# Patient Record
Sex: FEMALE | Race: OTHER | NOT HISPANIC OR LATINO | URBAN - METROPOLITAN AREA
[De-identification: names, ages, dates, MRNs, and addresses within clinical notes are randomized per-mention and may not be internally consistent; named-entity substitution may affect disease eponyms.]

---

## 2023-11-26 ENCOUNTER — OUTPATIENT (OUTPATIENT)
Dept: OUTPATIENT SERVICES | Facility: HOSPITAL | Age: 28
LOS: 1 days | End: 2023-11-26
Payer: MEDICAID

## 2023-11-26 DIAGNOSIS — O26.899 OTHER SPECIFIED PREGNANCY RELATED CONDITIONS, UNSPECIFIED TRIMESTER: ICD-10-CM

## 2023-11-26 DIAGNOSIS — Z3A.00 WEEKS OF GESTATION OF PREGNANCY NOT SPECIFIED: ICD-10-CM

## 2023-11-26 PROCEDURE — 59025 FETAL NON-STRESS TEST: CPT

## 2023-11-26 PROCEDURE — G0463: CPT

## 2023-12-30 ENCOUNTER — OUTPATIENT (OUTPATIENT)
Dept: OUTPATIENT SERVICES | Facility: HOSPITAL | Age: 28
LOS: 1 days | End: 2023-12-30
Payer: MEDICAID

## 2023-12-30 VITALS
OXYGEN SATURATION: 96 % | SYSTOLIC BLOOD PRESSURE: 101 MMHG | TEMPERATURE: 98 F | RESPIRATION RATE: 17 BRPM | DIASTOLIC BLOOD PRESSURE: 67 MMHG | HEART RATE: 93 BPM

## 2023-12-30 DIAGNOSIS — O26.899 OTHER SPECIFIED PREGNANCY RELATED CONDITIONS, UNSPECIFIED TRIMESTER: ICD-10-CM

## 2023-12-30 LAB
APPEARANCE UR: CLEAR — SIGNIFICANT CHANGE UP
APPEARANCE UR: CLEAR — SIGNIFICANT CHANGE UP
BILIRUB UR-MCNC: NEGATIVE — SIGNIFICANT CHANGE UP
BILIRUB UR-MCNC: NEGATIVE — SIGNIFICANT CHANGE UP
COLOR SPEC: YELLOW — SIGNIFICANT CHANGE UP
COLOR SPEC: YELLOW — SIGNIFICANT CHANGE UP
DIFF PNL FLD: NEGATIVE — SIGNIFICANT CHANGE UP
DIFF PNL FLD: NEGATIVE — SIGNIFICANT CHANGE UP
GLUCOSE UR QL: NEGATIVE MG/DL — SIGNIFICANT CHANGE UP
GLUCOSE UR QL: NEGATIVE MG/DL — SIGNIFICANT CHANGE UP
KETONES UR-MCNC: NEGATIVE MG/DL — SIGNIFICANT CHANGE UP
KETONES UR-MCNC: NEGATIVE MG/DL — SIGNIFICANT CHANGE UP
LEUKOCYTE ESTERASE UR-ACNC: ABNORMAL
LEUKOCYTE ESTERASE UR-ACNC: ABNORMAL
NITRITE UR-MCNC: NEGATIVE — SIGNIFICANT CHANGE UP
NITRITE UR-MCNC: NEGATIVE — SIGNIFICANT CHANGE UP
PH UR: 5.5 — SIGNIFICANT CHANGE UP (ref 5–8)
PH UR: 5.5 — SIGNIFICANT CHANGE UP (ref 5–8)
PROT UR-MCNC: NEGATIVE MG/DL — SIGNIFICANT CHANGE UP
PROT UR-MCNC: NEGATIVE MG/DL — SIGNIFICANT CHANGE UP
SP GR SPEC: 1.02 — SIGNIFICANT CHANGE UP (ref 1–1.03)
SP GR SPEC: 1.02 — SIGNIFICANT CHANGE UP (ref 1–1.03)
UROBILINOGEN FLD QL: 0.2 MG/DL — SIGNIFICANT CHANGE UP (ref 0.2–1)
UROBILINOGEN FLD QL: 0.2 MG/DL — SIGNIFICANT CHANGE UP (ref 0.2–1)

## 2023-12-30 PROCEDURE — G0463: CPT

## 2023-12-30 PROCEDURE — 99221 1ST HOSP IP/OBS SF/LOW 40: CPT | Mod: 25

## 2023-12-30 PROCEDURE — 81001 URINALYSIS AUTO W/SCOPE: CPT

## 2023-12-30 PROCEDURE — 59025 FETAL NON-STRESS TEST: CPT | Mod: 26

## 2023-12-30 PROCEDURE — 59025 FETAL NON-STRESS TEST: CPT

## 2023-12-30 PROCEDURE — 99285 EMERGENCY DEPT VISIT HI MDM: CPT

## 2023-12-30 NOTE — OB PROVIDER TRIAGE NOTE - ADDITIONAL INSTRUCTIONS
-Discharge home with strict precautions  -Report back to triage with vaginal bleeding, leakage of fluid, decreased fetal movement, abdominal or pelvic pain or any other concerns.  -Continue to use treatment for yeast infection  -Kick counts reviewed  -Increase oral hydration

## 2023-12-30 NOTE — OB PROVIDER TRIAGE NOTE - NS_DISPOSITION_OBGYN_ALL_OB
Home med: Losartan, Chlorothiazide     - MED REC NEEDED  - Hold BP Med  if  BP: <110/60, HR Discharge Home med: Losartan, Chlorothiazide   - MED REC NEEDED  - Hold BP Med  if  BP: <110/60,

## 2023-12-30 NOTE — OB PROVIDER TRIAGE NOTE - NSOBPROVIDERNOTE_OBGYN_ALL_OB_FT
28 year old  at 31w6d for rule out  labor vs UTI.  Current yeast infection 28 year old  at 31w6d for rule out  labor vs UTI.  Current yeast infection.  Reactive tracing with reassuring fetal status.     -Discharge home with strict precautions  -Report back to triage with vaginal bleeding, leakage of fluid, decreased fetal movement, abdominal or pelvic pain or any other concerns.  -Continue to use treatment for yeast infection  -Kick counts reviewed  -Increase oral hydration    Discussed with Dr Xavier

## 2023-12-30 NOTE — OB PROVIDER TRIAGE NOTE - NSHPPHYSICALEXAM_GEN_ALL_CORE
Gen: A&Ox3, NAD  Chest: CTABL   Cardiac: S1+S2+ RRR  Abdomen: Soft, nontender, +BS, no guarding, no rebound, gravid uterus  Extremities: Nontender, no worsening edema, DTRS 2+    VE: C/L/P + yeast on exam

## 2023-12-30 NOTE — OB RN TRIAGE NOTE - NS_DISCHARGEPRINT_OBGYN_ALL_OB
OB Discharge Instructions/[place]  OB Triage Discharge Instructions  OB Discharge Instructions/[place]  OB Triage Discharge Instructions

## 2023-12-30 NOTE — OB RN TRIAGE NOTE - NSNURSINGINSTR_OBGYN_ALL_OB_FT
Discharge instructions given to patient. Patient verbalized understanding. Fetal Kick count given to patient. No distress noted and no other concerns to be addressed at this time

## 2023-12-30 NOTE — OB PROVIDER TRIAGE NOTE - HISTORY OF PRESENT ILLNESS
Patient is a 28 year old  at 31w6d who presents to triage with complaint of lower abdominal and pelvic pain last night.  States that she felt her abdomen get hard approx one time an hour.  States that this morning she feels better.  Denies vaginal bleeding, leakage of fluid, decreased fetal movement or any other concerns.  Currently undergoing treatment for yeast infection.   PNC with Dr Xavier - pregnancy complicated with recurrent chlamydia   PMhx: Denies  SxHx: Denies  Meds: PNV, terconazole  Allergies: NKDA  OBHx: Primigravida  GynHx: Normal menses, one partner, + chlamydia, no other known stds, no cysts, no fibroids  SocialHx: neg x 3, no anxiety or depression

## 2023-12-30 NOTE — OB RN TRIAGE NOTE - FALL HARM RISK - UNIVERSAL INTERVENTIONS
Bed in lowest position, wheels locked, appropriate side rails in place/Call bell, personal items and telephone in reach/Instruct patient to call for assistance before getting out of bed or chair/Non-slip footwear when patient is out of bed/[place] to call system/Physically safe environment - no spills, clutter or unnecessary equipment/Purposeful Proactive Rounding/Room/bathroom lighting operational, light cord in reach Bed in lowest position, wheels locked, appropriate side rails in place/Call bell, personal items and telephone in reach/Instruct patient to call for assistance before getting out of bed or chair/Non-slip footwear when patient is out of bed/[place] to call system/Physically safe environment - no spills, clutter or unnecessary equipment/Purposeful Proactive Rounding/Room/bathroom lighting operational, light cord in reach

## 2024-01-02 DIAGNOSIS — Z3A.31 31 WEEKS GESTATION OF PREGNANCY: ICD-10-CM

## 2024-01-02 DIAGNOSIS — R10.30 LOWER ABDOMINAL PAIN, UNSPECIFIED: ICD-10-CM

## 2024-01-02 DIAGNOSIS — R10.2 PELVIC AND PERINEAL PAIN: ICD-10-CM

## 2024-01-02 DIAGNOSIS — B37.9 CANDIDIASIS, UNSPECIFIED: ICD-10-CM

## 2024-01-02 DIAGNOSIS — O26.893 OTHER SPECIFIED PREGNANCY RELATED CONDITIONS, THIRD TRIMESTER: ICD-10-CM

## 2024-01-02 DIAGNOSIS — O98.813 OTHER MATERNAL INFECTIOUS AND PARASITIC DISEASES COMPLICATING PREGNANCY, THIRD TRIMESTER: ICD-10-CM

## 2024-02-27 ENCOUNTER — OUTPATIENT (OUTPATIENT)
Dept: OUTPATIENT SERVICES | Facility: HOSPITAL | Age: 29
LOS: 1 days | End: 2024-02-27
Payer: MEDICAID

## 2024-02-27 VITALS
RESPIRATION RATE: 18 BRPM | HEART RATE: 107 BPM | TEMPERATURE: 98 F | DIASTOLIC BLOOD PRESSURE: 83 MMHG | SYSTOLIC BLOOD PRESSURE: 122 MMHG | OXYGEN SATURATION: 99 %

## 2024-02-27 DIAGNOSIS — O26.899 OTHER SPECIFIED PREGNANCY RELATED CONDITIONS, UNSPECIFIED TRIMESTER: ICD-10-CM

## 2024-02-27 PROBLEM — Z78.9 OTHER SPECIFIED HEALTH STATUS: Chronic | Status: ACTIVE | Noted: 2023-12-30

## 2024-02-27 LAB — AMNISURE ROM (RUPTURE OF MEMBRANES): NEGATIVE — SIGNIFICANT CHANGE UP

## 2024-02-27 PROCEDURE — 84112 EVAL AMNIOTIC FLUID PROTEIN: CPT

## 2024-02-27 PROCEDURE — 99212 OFFICE O/P EST SF 10 MIN: CPT

## 2024-02-27 PROCEDURE — 76815 OB US LIMITED FETUS(S): CPT

## 2024-02-27 PROCEDURE — 59025 FETAL NON-STRESS TEST: CPT

## 2024-02-27 PROCEDURE — 76819 FETAL BIOPHYS PROFIL W/O NST: CPT

## 2024-02-27 PROCEDURE — 76815 OB US LIMITED FETUS(S): CPT | Mod: 26

## 2024-02-27 PROCEDURE — 76819 FETAL BIOPHYS PROFIL W/O NST: CPT | Mod: 26

## 2024-02-27 PROCEDURE — G0463: CPT

## 2024-02-27 NOTE — OB PROVIDER TRIAGE NOTE - HISTORY OF PRESENT ILLNESS
This is a 29 y/o female  presenting for a NST/BPP. Patient was seen by dr Xavier on  and not yet dilated, was informed to follow up today for a NST and BPP for post dates . Pt offers no complaints today, she states she is having pressure but no contractions, vaginal bleeding.  + fetal movement and minimal clear discharge. Pt indicates that during her pregnancy she was treated for candida. stopped taking prenatals about a month ago. uncomplicated prenatal care.   GYN: LMP 23, EDC: 24, no STD, fibroids or cysts, candida - treated   PMHx; none  PSHx:  none  allergies : none   no drugs, smoking or illicit drugs during pregnancy    29 y/o female  siup @ 40.2wks  presenting for a NST/BPP. Patient was seen by dr Xavier on  and not yet dilated, was informed to follow up today for a NST and BPP for post dates . Pt offers no complaints today, she states she is having pressure but no contractions, vaginal bleeding.  + fetal movement and minimal clear discharge. Pt indicates that during her pregnancy she was treated for candida.   Uncomplicated PNC with Dr. Xavier    PMHx; denies  PSHx:  denies  Pgyn: LMP 2023, +candida in pregnancy treated, denies h/o ovarian cysts or fibroids; denies STDs or abnormal pap smears  allergies : nkda   social: no drugs, smoking or illicit drugs during pregnancy

## 2024-02-27 NOTE — OB PROVIDER TRIAGE NOTE - NSOBPROVIDERNOTE_OBGYN_ALL_OB_FT
29 y/o  here for NST/BPP   if pt is stable and NST and BPP are reassuring may come back on Friday for induction of labor  seen with TYLER CORNELIUS reviewed with Dr. Rodriguez    d/w dr Xavier 29 y/o  here for NST/BPP   if pt is stable and NST and BPP are reassuring may come back on Friday for induction of labor  amnisure done   seen with TYLER CORNELIUS reviewed with Dr. Rodriguez    d/w dr Xavier 29 y/o  here for NST/BPP , R/O prom  -bpp and nst reassuring  -f/u amnisure    d/w Dr Duc CORNELIUS reviewed with Dr. Rodriguez 27 y/o  here for NST/BPP , R/O prom  -bpp and nst reassuring  - amnisure negative   - will schedule for induction on Thursday, per dr Xavier   - d/c home with labor precaution and fetal kick instructions   - NST review with dr Rodriguez   - d/w dr Xavier

## 2024-02-27 NOTE — OB PROVIDER TRIAGE NOTE - NSHPPHYSICALEXAM_GEN_ALL_CORE
General : a/o x3, comfortable, in NAD  abd: soft nontender  gravid Pt comfortable in NAD  ICU Vital Signs Last 24 Hrs  T(C): 36.9 (27 Feb 2024 11:23), Max: 36.9 (27 Feb 2024 11:23)  T(F): 98.4 (27 Feb 2024 11:23), Max: 98.4 (27 Feb 2024 11:23)  HR: 107 (27 Feb 2024 11:23) (107 - 107)  BP: 122/83 (27 Feb 2024 11:23) (122/83 - 122/83)  RR: 18 (27 Feb 2024 11:23) (18 - 18)  SpO2: 99% (27 Feb 2024 11:23) (99% - 99%)    Abd: gravid, soft nontender    Ext; soft, NT, no edema  SSE: small amount of cream/white discharge, no vaginal bleeding   SVE: 0.5/50/-3/int, soft

## 2024-02-27 NOTE — OB PROVIDER TRIAGE NOTE - ADDITIONAL INSTRUCTIONS
-bpp and nst reassuring  - amnisure negative   - scheduled for induction on Thursday at 1 pm , per dr Xavier   - d/c home with labor precaution and fetal kick instructions   - NST review with dr Rodriguez   - d/w dr Xavier

## 2024-02-27 NOTE — OB RN TRIAGE NOTE - CHIEF COMPLAINT QUOTE
"Doctor told me to come for a sonogram because I am past my due date" "Doctor told me to come for a sonogram because I am past my due date and I have had clear fluid in my underwear since Wednesday"

## 2024-02-27 NOTE — OB PROVIDER TRIAGE NOTE - NSHPLABSRESULTS_GEN_ALL_CORE
< from: US OB Fetus Limited (02.27.24 @ 12:27) >    PROCEDURE DATE:  02/27/2024          INTERPRETATION:  Transabdominal obstetrical ultrasound for fetal position   and biophysical profile    Indication: Postdates evaluation.    Transabdominal obstetrical ultrasound is performed for fetal position and   biophysical profile. A single intrauterine gestation is seen. The fetal   presentation is cephalic. The cervix is not visualized due to shadowing   from the fetal skull. The placenta is posterior in location and it   appears grossly intact. There is positive fetal cardiac activity at 143   BPM. The MALLIKA measures 6.5 cm.    Fetal measurements for gestational age:  Biparietal diameter 9.55 cm, 39 weeks and 0days  Head circumference 33.89 cm, 39 weeks and 0 days  Abdominal circumference 34.89 cm, 38 weeks and 6 days  Femoral length 7.27 cm, 37 weeks and 2 days    The average gestational age by ultrasound is 38 weeks and 4 days.   Expected date of delivery by ultrasound is 3/8/2024.    Biophysical profile receives a score of 8 out of 8. Estimated fetal   weight is 3507 g    The fetal diaphragm, stomach, kidneys and urinary bladder appear grossly   unremarkable.    Impression: Single live intrauterine gestation with a biophysical profile   of 8 out of 8.    The MALLIKA measures 6.5 cm.    --- End of Report ---    < from:  Fetal Bio Profile w/o Non-Stress (02.27.24 @ 12:27) >    PROCEDURE DATE:  02/27/2024          INTERPRETATION:  Transabdominal obstetrical ultrasound for fetal position   and biophysical profile    Indication: Postdates evaluation.    Transabdominal obstetrical ultrasound is performed for fetal position and   biophysical profile. A single intrauterine gestation is seen. The fetal   presentation is cephalic. The cervix is not visualized due to shadowing   from the fetal skull. The placenta is posterior in location and it   appears grossly intact. There is positive fetal cardiac activity at 143   BPM. The MALLIKA measures 6.5 cm.    Fetal measurements for gestational age:  Biparietal diameter 9.55 cm, 39 weeks and 0days  Head circumference 33.89 cm, 39 weeks and 0 days  Abdominal circumference 34.89 cm, 38 weeks and 6 days  Femoral length 7.27 cm, 37 weeks and 2 days    The average gestational age by ultrasound is 38 weeks and 4 days.   Expected date of delivery by ultrasound is 3/8/2024.    Biophysical profile receives a score of 8 out of 8. Estimated fetal   weight is 3507 g    The fetal diaphragm, stomach, kidneys and urinary bladder appear grossly   unremarkable.    Impression: Single live intrauterine gestation with a biophysical profile   of 8 out of 8.    The MALLIKA measures 6.5 cm.    --- End of Report ---      < end of copied text >

## 2024-02-29 ENCOUNTER — INPATIENT (INPATIENT)
Facility: HOSPITAL | Age: 29
LOS: 2 days | Discharge: HOME | End: 2024-03-03
Attending: OBSTETRICS & GYNECOLOGY | Admitting: OBSTETRICS & GYNECOLOGY
Payer: MEDICAID

## 2024-02-29 VITALS
WEIGHT: 154.1 LBS | OXYGEN SATURATION: 98 % | HEART RATE: 100 BPM | RESPIRATION RATE: 15 BRPM | HEIGHT: 57 IN | TEMPERATURE: 99 F | SYSTOLIC BLOOD PRESSURE: 123 MMHG | DIASTOLIC BLOOD PRESSURE: 82 MMHG

## 2024-02-29 DIAGNOSIS — Z03.71 ENCOUNTER FOR SUSPECTED PROBLEM WITH AMNIOTIC CAVITY AND MEMBRANE RULED OUT: ICD-10-CM

## 2024-02-29 DIAGNOSIS — O48.0 POST-TERM PREGNANCY: ICD-10-CM

## 2024-02-29 DIAGNOSIS — O26.899 OTHER SPECIFIED PREGNANCY RELATED CONDITIONS, UNSPECIFIED TRIMESTER: ICD-10-CM

## 2024-02-29 DIAGNOSIS — Z3A.40 40 WEEKS GESTATION OF PREGNANCY: ICD-10-CM

## 2024-02-29 DIAGNOSIS — Z34.80 ENCOUNTER FOR SUPERVISION OF OTHER NORMAL PREGNANCY, UNSPECIFIED TRIMESTER: ICD-10-CM

## 2024-02-29 LAB
APTT BLD: 27 SEC — SIGNIFICANT CHANGE UP (ref 24.5–35.6)
BASOPHILS # BLD AUTO: 0.01 K/UL — SIGNIFICANT CHANGE UP (ref 0–0.2)
BASOPHILS NFR BLD AUTO: 0.1 % — SIGNIFICANT CHANGE UP (ref 0–2)
EOSINOPHIL # BLD AUTO: 0.02 K/UL — SIGNIFICANT CHANGE UP (ref 0–0.5)
EOSINOPHIL NFR BLD AUTO: 0.2 % — SIGNIFICANT CHANGE UP (ref 0–6)
FIBRINOGEN PPP-MCNC: 431 MG/DL — SIGNIFICANT CHANGE UP (ref 200–475)
HCT VFR BLD CALC: 35.8 % — SIGNIFICANT CHANGE UP (ref 34.5–45)
HGB BLD-MCNC: 11.5 G/DL — SIGNIFICANT CHANGE UP (ref 11.5–15.5)
IMM GRANULOCYTES NFR BLD AUTO: 0.5 % — SIGNIFICANT CHANGE UP (ref 0–0.9)
INR BLD: 0.97 RATIO — SIGNIFICANT CHANGE UP (ref 0.85–1.18)
LYMPHOCYTES # BLD AUTO: 2.14 K/UL — SIGNIFICANT CHANGE UP (ref 1–3.3)
LYMPHOCYTES # BLD AUTO: 26.5 % — SIGNIFICANT CHANGE UP (ref 13–44)
MCHC RBC-ENTMCNC: 25.5 PG — LOW (ref 27–34)
MCHC RBC-ENTMCNC: 32.1 GM/DL — SIGNIFICANT CHANGE UP (ref 32–36)
MCV RBC AUTO: 79.4 FL — LOW (ref 80–100)
MONOCYTES # BLD AUTO: 0.49 K/UL — SIGNIFICANT CHANGE UP (ref 0–0.9)
MONOCYTES NFR BLD AUTO: 6.1 % — SIGNIFICANT CHANGE UP (ref 2–14)
NEUTROPHILS # BLD AUTO: 5.38 K/UL — SIGNIFICANT CHANGE UP (ref 1.8–7.4)
NEUTROPHILS NFR BLD AUTO: 66.6 % — SIGNIFICANT CHANGE UP (ref 43–77)
NRBC # BLD: 0 /100 WBCS — SIGNIFICANT CHANGE UP (ref 0–0)
PLATELET # BLD AUTO: 269 K/UL — SIGNIFICANT CHANGE UP (ref 150–400)
PROTHROM AB SERPL-ACNC: 11.1 SEC — SIGNIFICANT CHANGE UP (ref 9.5–13)
RBC # BLD: 4.51 M/UL — SIGNIFICANT CHANGE UP (ref 3.8–5.2)
RBC # FLD: 14.3 % — SIGNIFICANT CHANGE UP (ref 10.3–14.5)
WBC # BLD: 8.08 K/UL — SIGNIFICANT CHANGE UP (ref 3.8–10.5)
WBC # FLD AUTO: 8.08 K/UL — SIGNIFICANT CHANGE UP (ref 3.8–10.5)

## 2024-02-29 RX ORDER — SODIUM CHLORIDE 9 MG/ML
1000 INJECTION, SOLUTION INTRAVENOUS
Refills: 0 | Status: DISCONTINUED | OUTPATIENT
Start: 2024-02-29 | End: 2024-03-01

## 2024-02-29 RX ORDER — OXYTOCIN 10 UNIT/ML
333.33 VIAL (ML) INJECTION
Qty: 20 | Refills: 0 | Status: COMPLETED | OUTPATIENT
Start: 2024-02-29

## 2024-02-29 RX ORDER — CHLORHEXIDINE GLUCONATE 213 G/1000ML
1 SOLUTION TOPICAL DAILY
Refills: 0 | Status: DISCONTINUED | OUTPATIENT
Start: 2024-02-29 | End: 2024-03-01

## 2024-02-29 RX ADMIN — SODIUM CHLORIDE 125 MILLILITER(S): 9 INJECTION, SOLUTION INTRAVENOUS at 15:09

## 2024-02-29 NOTE — OB PROVIDER LABOR PROGRESS NOTE - ASSESSMENT
27yo  @40.2wks miol for postdates, efw 3500, gbs neg, pt stable   - cont close maternal and fetal monitoring  - pain management per pt request  - vaginal cytotec per protocol, first dose given at 1530pm   - discussed with Dr. Xavier

## 2024-02-29 NOTE — OB PROVIDER LABOR PROGRESS NOTE - ASSESSMENT
Dr Xavier examined- second dose of cytotec given at 8pm . Next dose at 11pm   Category 1   NST reviewed with Dr Xavier  continue to monitor   seen at with dr Xavier

## 2024-02-29 NOTE — OB PROVIDER LABOR PROGRESS NOTE - ASSESSMENT
CAT 1 on Vaginal cytotec - #3 dose given at 11:10   f/u dose in 3 hours  continue monitoring  NST d/w dr hoff   d/w w/dr hoff

## 2024-02-29 NOTE — OB RN PATIENT PROFILE - FUNCTIONAL ASSESSMENT - BASIC MOBILITY 6.
4-calculated by average /Not able to assess (calculate score using [facility] averaging method)

## 2024-02-29 NOTE — OB PROVIDER H&P - ASSESSMENT
29yo  siup @ 40.4wks, admit for scheduled miol postdates  -routine admission labs and fluids  -continue fetal monitoring  -maternal vitals   -vaginal cytotec protocol  d/w Dr. Xavier,   NST reviewed with Dr. Carvalho attending on call

## 2024-02-29 NOTE — OB RN PATIENT PROFILE - FALL HARM RISK - UNIVERSAL INTERVENTIONS
Bed in lowest position, wheels locked, appropriate side rails in place/Call bell, personal items and telephone in reach/Instruct patient to call for assistance before getting out of bed or chair/Non-slip footwear when patient is out of bed/[place] to call system/Physically safe environment - no spills, clutter or unnecessary equipment/Purposeful Proactive Rounding/Room/bathroom lighting operational, light cord in reach

## 2024-02-29 NOTE — OB PROVIDER H&P - HISTORY OF PRESENT ILLNESS
29 y/o female  siup @ 40.4wks presents to L&D as scheduled for iol for postdates.  Pt offers no complaints today, occasional contractions. Denies vaginal bleeding or leaking fluid.  + fetal movement.  Pt indicates that during her pregnancy she was treated for candida.   Uncomplicated PNC with Dr. Xavier    PMHx; denies  PSHx:  denies  Pgyn: LMP 2023, +candida in pregnancy treated, denies h/o ovarian cysts or fibroids; denies STDs or abnormal pap smears  allergies : nkda   social: no drugs, smoking or illicit drugs during pregnancy

## 2024-02-29 NOTE — OB PROVIDER H&P - NSHPPHYSICALEXAM_GEN_ALL_CORE
PE: Pt appears comfortable and in NAD  Abd: gravid, soft, NT; no guarding or rebound  Ext; soft, NT; No edema  SVE: 2/50/-3/int

## 2024-02-29 NOTE — OB PROVIDER LABOR PROGRESS NOTE - NS_SUBJECTIVE/OBJECTIVE_OBGYN_ALL_OB_FT
pt offers no complaints    vaginal cytotec placed in posterior fornix by TYLER Fish, pt tolerated it well

## 2024-03-01 LAB
ABO RH CONFIRMATION: SIGNIFICANT CHANGE UP
T PALLIDUM AB TITR SER: NEGATIVE — SIGNIFICANT CHANGE UP

## 2024-03-01 PROCEDURE — 88307 TISSUE EXAM BY PATHOLOGIST: CPT | Mod: 26

## 2024-03-01 RX ORDER — AMPICILLIN TRIHYDRATE 250 MG
2 CAPSULE ORAL EVERY 6 HOURS
Refills: 0 | Status: DISCONTINUED | OUTPATIENT
Start: 2024-03-01 | End: 2024-03-01

## 2024-03-01 RX ORDER — ONDANSETRON 8 MG/1
4 TABLET, FILM COATED ORAL ONCE
Refills: 0 | Status: COMPLETED | OUTPATIENT
Start: 2024-03-01 | End: 2024-03-01

## 2024-03-01 RX ORDER — ACETAMINOPHEN 500 MG
1000 TABLET ORAL ONCE
Refills: 0 | Status: COMPLETED | OUTPATIENT
Start: 2024-03-01 | End: 2024-03-01

## 2024-03-01 RX ORDER — GENTAMICIN SULFATE 40 MG/ML
120 VIAL (ML) INJECTION ONCE
Refills: 0 | Status: COMPLETED | OUTPATIENT
Start: 2024-03-01 | End: 2024-03-01

## 2024-03-01 RX ORDER — AMPICILLIN TRIHYDRATE 250 MG
2 CAPSULE ORAL ONCE
Refills: 0 | Status: COMPLETED | OUTPATIENT
Start: 2024-03-01 | End: 2024-03-01

## 2024-03-01 RX ORDER — SODIUM CHLORIDE 9 MG/ML
1000 INJECTION, SOLUTION INTRAVENOUS
Refills: 0 | Status: DISCONTINUED | OUTPATIENT
Start: 2024-03-01 | End: 2024-03-03

## 2024-03-01 RX ORDER — AZITHROMYCIN 500 MG/1
500 TABLET, FILM COATED ORAL ONCE
Refills: 0 | Status: COMPLETED | OUTPATIENT
Start: 2024-03-01 | End: 2024-03-01

## 2024-03-01 RX ORDER — MAGNESIUM HYDROXIDE 400 MG/1
30 TABLET, CHEWABLE ORAL
Refills: 0 | Status: DISCONTINUED | OUTPATIENT
Start: 2024-03-01 | End: 2024-03-03

## 2024-03-01 RX ORDER — KETOROLAC TROMETHAMINE 30 MG/ML
30 SYRINGE (ML) INJECTION EVERY 6 HOURS
Refills: 0 | Status: DISCONTINUED | OUTPATIENT
Start: 2024-03-01 | End: 2024-03-03

## 2024-03-01 RX ORDER — ACETAMINOPHEN 500 MG
975 TABLET ORAL
Refills: 0 | Status: DISCONTINUED | OUTPATIENT
Start: 2024-03-01 | End: 2024-03-03

## 2024-03-01 RX ORDER — LANOLIN
1 OINTMENT (GRAM) TOPICAL EVERY 6 HOURS
Refills: 0 | Status: DISCONTINUED | OUTPATIENT
Start: 2024-03-01 | End: 2024-03-03

## 2024-03-01 RX ORDER — FAMOTIDINE 10 MG/ML
20 INJECTION INTRAVENOUS ONCE
Refills: 0 | Status: COMPLETED | OUTPATIENT
Start: 2024-03-01 | End: 2024-03-01

## 2024-03-01 RX ORDER — FAMOTIDINE 10 MG/ML
20 INJECTION INTRAVENOUS ONCE
Refills: 0 | Status: DISCONTINUED | OUTPATIENT
Start: 2024-03-01 | End: 2024-03-01

## 2024-03-01 RX ORDER — OXYTOCIN 10 UNIT/ML
333.33 VIAL (ML) INJECTION
Qty: 20 | Refills: 0 | Status: DISCONTINUED | OUTPATIENT
Start: 2024-03-01 | End: 2024-03-03

## 2024-03-01 RX ORDER — IBUPROFEN 200 MG
600 TABLET ORAL EVERY 6 HOURS
Refills: 0 | Status: DISCONTINUED | OUTPATIENT
Start: 2024-03-01 | End: 2024-03-03

## 2024-03-01 RX ORDER — TETANUS TOXOID, REDUCED DIPHTHERIA TOXOID AND ACELLULAR PERTUSSIS VACCINE, ADSORBED 5; 2.5; 8; 8; 2.5 [IU]/.5ML; [IU]/.5ML; UG/.5ML; UG/.5ML; UG/.5ML
0.5 SUSPENSION INTRAMUSCULAR ONCE
Refills: 0 | Status: DISCONTINUED | OUTPATIENT
Start: 2024-03-01 | End: 2024-03-03

## 2024-03-01 RX ORDER — OXYCODONE HYDROCHLORIDE 5 MG/1
5 TABLET ORAL
Refills: 0 | Status: DISCONTINUED | OUTPATIENT
Start: 2024-03-01 | End: 2024-03-03

## 2024-03-01 RX ORDER — FERROUS SULFATE 325(65) MG
325 TABLET ORAL DAILY
Refills: 0 | Status: DISCONTINUED | OUTPATIENT
Start: 2024-03-01 | End: 2024-03-03

## 2024-03-01 RX ORDER — FOLIC ACID 0.8 MG
1 TABLET ORAL DAILY
Refills: 0 | Status: DISCONTINUED | OUTPATIENT
Start: 2024-03-01 | End: 2024-03-03

## 2024-03-01 RX ORDER — HEPARIN SODIUM 5000 [USP'U]/ML
5000 INJECTION INTRAVENOUS; SUBCUTANEOUS EVERY 12 HOURS
Refills: 0 | Status: DISCONTINUED | OUTPATIENT
Start: 2024-03-02 | End: 2024-03-03

## 2024-03-01 RX ORDER — OXYTOCIN 10 UNIT/ML
2 VIAL (ML) INJECTION
Qty: 30 | Refills: 0 | Status: DISCONTINUED | OUTPATIENT
Start: 2024-03-01 | End: 2024-03-01

## 2024-03-01 RX ORDER — GENTAMICIN SULFATE 40 MG/ML
VIAL (ML) INJECTION
Refills: 0 | Status: DISCONTINUED | OUTPATIENT
Start: 2024-03-01 | End: 2024-03-03

## 2024-03-01 RX ORDER — DIPHENHYDRAMINE HCL 50 MG
25 CAPSULE ORAL EVERY 6 HOURS
Refills: 0 | Status: DISCONTINUED | OUTPATIENT
Start: 2024-03-01 | End: 2024-03-03

## 2024-03-01 RX ORDER — AZITHROMYCIN 500 MG/1
500 TABLET, FILM COATED ORAL ONCE
Refills: 0 | Status: DISCONTINUED | OUTPATIENT
Start: 2024-03-01 | End: 2024-03-01

## 2024-03-01 RX ORDER — CEFAZOLIN SODIUM 1 G
2000 VIAL (EA) INJECTION ONCE
Refills: 0 | Status: COMPLETED | OUTPATIENT
Start: 2024-03-01 | End: 2024-03-01

## 2024-03-01 RX ORDER — AMPICILLIN TRIHYDRATE 250 MG
CAPSULE ORAL
Refills: 0 | Status: DISCONTINUED | OUTPATIENT
Start: 2024-03-01 | End: 2024-03-01

## 2024-03-01 RX ORDER — SIMETHICONE 80 MG/1
80 TABLET, CHEWABLE ORAL EVERY 4 HOURS
Refills: 0 | Status: DISCONTINUED | OUTPATIENT
Start: 2024-03-01 | End: 2024-03-03

## 2024-03-01 RX ORDER — GENTAMICIN SULFATE 40 MG/ML
80 VIAL (ML) INJECTION EVERY 8 HOURS
Refills: 0 | Status: DISCONTINUED | OUTPATIENT
Start: 2024-03-01 | End: 2024-03-03

## 2024-03-01 RX ADMIN — Medication 1000 MILLIGRAM(S): at 17:24

## 2024-03-01 RX ADMIN — AZITHROMYCIN 255 MILLIGRAM(S): 500 TABLET, FILM COATED ORAL at 15:00

## 2024-03-01 RX ADMIN — Medication 100 MILLIGRAM(S): at 18:38

## 2024-03-01 RX ADMIN — Medication 200 MILLIGRAM(S): at 14:30

## 2024-03-01 RX ADMIN — FAMOTIDINE 20 MILLIGRAM(S): 10 INJECTION INTRAVENOUS at 17:23

## 2024-03-01 RX ADMIN — SIMETHICONE 80 MILLIGRAM(S): 80 TABLET, CHEWABLE ORAL at 22:24

## 2024-03-01 RX ADMIN — Medication 2 MILLIUNIT(S)/MIN: at 03:07

## 2024-03-01 RX ADMIN — Medication 400 MILLIGRAM(S): at 17:23

## 2024-03-01 RX ADMIN — ONDANSETRON 4 MILLIGRAM(S): 8 TABLET, FILM COATED ORAL at 13:37

## 2024-03-01 RX ADMIN — AZITHROMYCIN 255 MILLIGRAM(S): 500 TABLET, FILM COATED ORAL at 17:15

## 2024-03-01 RX ADMIN — Medication 200 MILLIGRAM(S): at 22:24

## 2024-03-01 RX ADMIN — Medication 100 MILLIGRAM(S): at 17:15

## 2024-03-01 RX ADMIN — Medication 200 GRAM(S): at 13:47

## 2024-03-01 RX ADMIN — Medication 100 MILLIGRAM(S): at 17:22

## 2024-03-01 NOTE — OB RN INTRAOPERATIVE NOTE - NSSELHIDDEN_OBGYN_ALL_OB_FT
[NS_DeliveryAttending1_OBGYN_ALL_OB_FT:[id number]==],[NS_DeliveryAssist1_OBGYN_ALL_OB_FT:[id number]]

## 2024-03-01 NOTE — OB PROVIDER LABOR PROGRESS NOTE - NSVAGINALEXAM_OBGYN_ALL_OB_DT
01-Mar-2024 12:14
01-Mar-2024 14:57
01-Mar-2024 02:40
01-Mar-2024 07:39
01-Mar-2024 06:40
29-Feb-2024 15:30
29-Feb-2024 20:00
29-Feb-2024 11:10

## 2024-03-01 NOTE — OB PROVIDER LABOR PROGRESS NOTE - ASSESSMENT
Cat 2 tracing  pt placed in left lateral position and peanut ball placed   iv bolus given    tracing improved   moderate variability with accels no decels fhr- 135 Cat 2 tracing  pt placed in left lateral position and peanut ball placed   iv bolus given    tracing improved   moderate variability with accels no decels fhr- 135  decrease pitocin to 6mu.

## 2024-03-01 NOTE — OB PROVIDER LABOR PROGRESS NOTE - NS_SUBJECTIVE/OBJECTIVE_OBGYN_ALL_OB_FT
f/u note:   ve: no cervical change 6.5/80/-1/vtx clear amniotic fluid     toco q irreg  pit has been off  iv tylenol given/ iv amp given  iv gent is now being given

## 2024-03-01 NOTE — OB PROVIDER DELIVERY SUMMARY - NSPROVIDERDELIVERYNOTE_OBGYN_ALL_OB_FT
Primry  without complications, baby with Apgars 9&9 at 1&5 min. Good hemostasis at end of procedure.

## 2024-03-01 NOTE — PROGRESS NOTE ADULT - ASSESSMENT
a/p: 28y  POD #0 s/p primary c/s @ 40w1d for failure to progress, cat II tracing complicated by intrapartum temp, suspect chorio. Pt stable at this time.  - gent/clinda post-op  - cont close monitoring  - cont post op care  - d/c cintron in AM  - cbc in AM  - pain management per pt request  - d/w Dr. Xavier

## 2024-03-01 NOTE — OB RN DELIVERY SUMMARY - NSSELHIDDEN_OBGYN_ALL_OB_FT
[NS_DeliveryAttending1_OBGYN_ALL_OB_FT:[id number]==],[NS_DeliveryAssist1_OBGYN_ALL_OB_FT:[id number]]

## 2024-03-01 NOTE — OB PROVIDER LABOR PROGRESS NOTE - NS_SUBJECTIVE/OBJECTIVE_OBGYN_ALL_OB_FT
f/u vag exam:     6.5/80/-0/vtx clear fluid iupc in place    fetal tracing baseline: 150 min to moderate variability  +early decel     toco q 2-3min    epidural in place: comfortable    recheck in 2-3hours as per dr hoff

## 2024-03-01 NOTE — OB NEONATOLOGY/PEDIATRICIAN DELIVERY SUMMARY - NSPEDSNEONOTESA_OBGYN_ALL_OB_FT
Neonatologists called to attend this  after chorioamnionitis, induction of labor for post-dates. Cephalic presentation. Infant emerged with good tone, spontaneous cry. Delayed cord clamping performed. Warmed, dried.     EOS risk score 0.44 using maternal Tmax 100.9F, received broad spectrum antibiotics 2-3.9 hours prior to delivery, GBS negative, incidence 0.1000 live births. To non-[facility] care/level-I nursery.

## 2024-03-01 NOTE — OB PROVIDER LABOR PROGRESS NOTE - ASSESSMENT
29 yo  at 40.2 weeks presenting for elective IOL, efw 3500     - continue close maternal and fetal monitoring   - pain management with epidural   - recheck cervical exam around 10 AM  - 2 IV lines   - 2 U PRBCs  - venodynes   - continue pitocin at 6 mU  - NST reviewed by Dr. Xavier   - Plan discussed with Dr. Xavier    Patient evaluated with TYLER Simon

## 2024-03-01 NOTE — OB PROVIDER LABOR PROGRESS NOTE - ASSESSMENT
27 y/o  @ 40 weeks 1 day present for elective iol. S/p vaginal cytotec currently on Pitocin for induction.  CAT I     - continue fetal/maternal monitoring   - epidural in place management per anesthesia   - npo   - Hsieh in place   - IUPC in place   - continue pitocin for induction per protocol   - D/w Dr. hoff

## 2024-03-01 NOTE — OB PROVIDER LABOR PROGRESS NOTE - NS_SUBJECTIVE/OBJECTIVE_OBGYN_ALL_OB_FT
noted fetal tachy  temp 100.3F oral   -dw dr hoff start iv abx suspect chorio - iv tylenol/iv amp/gent

## 2024-03-01 NOTE — OB PROVIDER LABOR PROGRESS NOTE - NS_SUBJECTIVE/OBJECTIVE_OBGYN_ALL_OB_FT
fetal tracing 180   suspect chorio  toco q 2-3min   as per dr luis eduardo carter the pitocin right now

## 2024-03-01 NOTE — OB PROVIDER LABOR PROGRESS NOTE - NS_SUBJECTIVE/OBJECTIVE_OBGYN_ALL_OB_FT
Patient evaluated at bedside with Dr. Xavier  Patient resting comfortably, denies current complaints   Patient on pitocin 6 mU  IUPC in place     Vital signs stable   Please refer to CPN for vital signs Patient evaluated at bedside with Dr. Xavier  Patient resting comfortably, denies current complaints   Patient on pitocin 6 mU  IUPC in place   patient with epidural in place    Vital signs stable   Please refer to CPN for vital signs

## 2024-03-01 NOTE — OB PROVIDER LABOR PROGRESS NOTE - NS_SUBJECTIVE/OBJECTIVE_OBGYN_ALL_OB_FT
Patient seen at bedside states she feels well overall with some bloody mucus.     Vital Signs Last 24 Hrs  T(C): 37.3 (2024 14:25), Max: 37.3 (2024 14:25)  T(F): 99.1 (2024 14:25), Max: 99.1 (2024 14:25)  HR: 100 (2024 14:25) (100 - 100)  BP: 123/82 (2024 14:25) (123/82 - 123/82)  BP(mean): --  RR: 15 (2024 14:25) (15 - 15)  SpO2: 98% (2024 14:25) (98% - 98%)    Parameters below as of 2024 14:25  Patient On (Oxygen Delivery Method): room air    Gen: well-appearing, NAD, A&Ox3  Chest: saturating well on RA, CTA b/l   Abd: soft, non-tender, ,   SVE: 6.5/80/-1/vtx   Tracing: baseline 140 bpm, moderate varibility, + accels, no decels   [place]: q2-5 mins

## 2024-03-01 NOTE — OB PROVIDER DELIVERY SUMMARY - NSSELHIDDEN_OBGYN_ALL_OB_FT
[NS_DeliveryAttending1_OBGYN_ALL_OB_FT:[id number]],[NS_DeliveryAssist1_OBGYN_ALL_OB_FT:[id number]]

## 2024-03-01 NOTE — OB PROVIDER LABOR PROGRESS NOTE - NS_OBIHIFHRDETAILS_OBGYN_ALL_OB_FT
135  moderate variability with recurrent late decels
FHR: 145, moderate variability, Accelerations and no decelerations
FHR: 140s, Moderate variability, + accelerations, no decelerations
FHR: 140s, moderate variability , accelerations and no decelerations
fht baseline 150, moderate variability, +accels, no decels
moderate variablity accels occasional late decel when ctx q 1 minutes  CAt 2 tracing  fhr 135  clear fluid
FHT: baseline: 130, moderate variability, + accels, no decels

## 2024-03-02 DIAGNOSIS — D64.9 ANEMIA, UNSPECIFIED: ICD-10-CM

## 2024-03-02 DIAGNOSIS — O41.1290 CHORIOAMNIONITIS, UNSPECIFIED TRIMESTER, NOT APPLICABLE OR UNSPECIFIED: ICD-10-CM

## 2024-03-02 LAB
BASOPHILS # BLD AUTO: 0.03 K/UL — SIGNIFICANT CHANGE UP (ref 0–0.2)
BASOPHILS # BLD AUTO: 0.07 K/UL — SIGNIFICANT CHANGE UP (ref 0–0.2)
BASOPHILS NFR BLD AUTO: 0.2 % — SIGNIFICANT CHANGE UP (ref 0–2)
BASOPHILS NFR BLD AUTO: 0.3 % — SIGNIFICANT CHANGE UP (ref 0–2)
EOSINOPHIL # BLD AUTO: 0.03 K/UL — SIGNIFICANT CHANGE UP (ref 0–0.5)
EOSINOPHIL # BLD AUTO: 0.05 K/UL — SIGNIFICANT CHANGE UP (ref 0–0.5)
EOSINOPHIL NFR BLD AUTO: 0.2 % — SIGNIFICANT CHANGE UP (ref 0–6)
EOSINOPHIL NFR BLD AUTO: 0.2 % — SIGNIFICANT CHANGE UP (ref 0–6)
HCT VFR BLD CALC: 19.6 % — CRITICAL LOW (ref 34.5–45)
HCT VFR BLD CALC: 28.4 % — LOW (ref 34.5–45)
HGB BLD-MCNC: 6.3 G/DL — CRITICAL LOW (ref 11.5–15.5)
HGB BLD-MCNC: 9.2 G/DL — LOW (ref 11.5–15.5)
HYPOCHROMIA BLD QL: SLIGHT — SIGNIFICANT CHANGE UP
IMM GRANULOCYTES NFR BLD AUTO: 0.5 % — SIGNIFICANT CHANGE UP (ref 0–0.9)
IMM GRANULOCYTES NFR BLD AUTO: 0.7 % — SIGNIFICANT CHANGE UP (ref 0–0.9)
LG PLATELETS BLD QL AUTO: SLIGHT — SIGNIFICANT CHANGE UP
LYMPHOCYTES # BLD AUTO: 1.64 K/UL — SIGNIFICANT CHANGE UP (ref 1–3.3)
LYMPHOCYTES # BLD AUTO: 10.1 % — LOW (ref 13–44)
LYMPHOCYTES # BLD AUTO: 11 % — LOW (ref 13–44)
LYMPHOCYTES # BLD AUTO: 2.1 K/UL — SIGNIFICANT CHANGE UP (ref 1–3.3)
MANUAL SMEAR VERIFICATION: SIGNIFICANT CHANGE UP
MCHC RBC-ENTMCNC: 25.2 PG — LOW (ref 27–34)
MCHC RBC-ENTMCNC: 26.1 PG — LOW (ref 27–34)
MCHC RBC-ENTMCNC: 32.1 GM/DL — SIGNIFICANT CHANGE UP (ref 32–36)
MCHC RBC-ENTMCNC: 32.4 GM/DL — SIGNIFICANT CHANGE UP (ref 32–36)
MCV RBC AUTO: 77.8 FL — LOW (ref 80–100)
MCV RBC AUTO: 81.3 FL — SIGNIFICANT CHANGE UP (ref 80–100)
MONOCYTES # BLD AUTO: 0.8 K/UL — SIGNIFICANT CHANGE UP (ref 0–0.9)
MONOCYTES # BLD AUTO: 1.11 K/UL — HIGH (ref 0–0.9)
MONOCYTES NFR BLD AUTO: 5.3 % — SIGNIFICANT CHANGE UP (ref 2–14)
MONOCYTES NFR BLD AUTO: 5.3 % — SIGNIFICANT CHANGE UP (ref 2–14)
NEUTROPHILS # BLD AUTO: 12.39 K/UL — HIGH (ref 1.8–7.4)
NEUTROPHILS # BLD AUTO: 17.37 K/UL — HIGH (ref 1.8–7.4)
NEUTROPHILS NFR BLD AUTO: 82.8 % — HIGH (ref 43–77)
NEUTROPHILS NFR BLD AUTO: 83.4 % — HIGH (ref 43–77)
NRBC # BLD: 0 /100 WBCS — SIGNIFICANT CHANGE UP (ref 0–0)
NRBC # BLD: 0 /100 WBCS — SIGNIFICANT CHANGE UP (ref 0–0)
PLAT MORPH BLD: NORMAL — SIGNIFICANT CHANGE UP
PLATELET # BLD AUTO: 147 K/UL — LOW (ref 150–400)
PLATELET # BLD AUTO: 223 K/UL — SIGNIFICANT CHANGE UP (ref 150–400)
PLATELET COUNT - ESTIMATE: NORMAL — SIGNIFICANT CHANGE UP
RBC # BLD: 2.41 M/UL — LOW (ref 3.8–5.2)
RBC # BLD: 3.65 M/UL — LOW (ref 3.8–5.2)
RBC # FLD: 14.9 % — HIGH (ref 10.3–14.5)
RBC # FLD: 15 % — HIGH (ref 10.3–14.5)
RBC BLD AUTO: ABNORMAL
WBC # BLD: 14.96 K/UL — HIGH (ref 3.8–10.5)
WBC # BLD: 20.85 K/UL — HIGH (ref 3.8–10.5)
WBC # FLD AUTO: 14.96 K/UL — HIGH (ref 3.8–10.5)
WBC # FLD AUTO: 20.85 K/UL — HIGH (ref 3.8–10.5)

## 2024-03-02 RX ORDER — FAMOTIDINE 10 MG/ML
20 INJECTION INTRAVENOUS
Refills: 0 | Status: DISCONTINUED | OUTPATIENT
Start: 2024-03-02 | End: 2024-03-03

## 2024-03-02 RX ORDER — SENNA PLUS 8.6 MG/1
2 TABLET ORAL DAILY
Refills: 0 | Status: DISCONTINUED | OUTPATIENT
Start: 2024-03-02 | End: 2024-03-03

## 2024-03-02 RX ADMIN — Medication 100 MILLIGRAM(S): at 18:14

## 2024-03-02 RX ADMIN — Medication 100 MILLIGRAM(S): at 01:54

## 2024-03-02 RX ADMIN — Medication 325 MILLIGRAM(S): at 12:13

## 2024-03-02 RX ADMIN — MAGNESIUM HYDROXIDE 30 MILLILITER(S): 400 TABLET, CHEWABLE ORAL at 10:20

## 2024-03-02 RX ADMIN — Medication 1 MILLIGRAM(S): at 12:13

## 2024-03-02 RX ADMIN — HEPARIN SODIUM 5000 UNIT(S): 5000 INJECTION INTRAVENOUS; SUBCUTANEOUS at 18:14

## 2024-03-02 RX ADMIN — Medication 30 MILLIGRAM(S): at 01:30

## 2024-03-02 RX ADMIN — Medication 30 MILLIGRAM(S): at 18:13

## 2024-03-02 RX ADMIN — Medication 30 MILLIGRAM(S): at 13:12

## 2024-03-02 RX ADMIN — SIMETHICONE 80 MILLIGRAM(S): 80 TABLET, CHEWABLE ORAL at 10:19

## 2024-03-02 RX ADMIN — Medication 975 MILLIGRAM(S): at 10:19

## 2024-03-02 RX ADMIN — Medication 30 MILLIGRAM(S): at 01:03

## 2024-03-02 RX ADMIN — Medication 30 MILLIGRAM(S): at 07:00

## 2024-03-02 RX ADMIN — Medication 975 MILLIGRAM(S): at 22:20

## 2024-03-02 RX ADMIN — Medication 975 MILLIGRAM(S): at 21:18

## 2024-03-02 RX ADMIN — Medication 30 MILLIGRAM(S): at 12:12

## 2024-03-02 RX ADMIN — SENNA PLUS 2 TABLET(S): 8.6 TABLET ORAL at 12:13

## 2024-03-02 RX ADMIN — Medication 975 MILLIGRAM(S): at 11:19

## 2024-03-02 RX ADMIN — SIMETHICONE 80 MILLIGRAM(S): 80 TABLET, CHEWABLE ORAL at 22:08

## 2024-03-02 RX ADMIN — FAMOTIDINE 20 MILLIGRAM(S): 10 INJECTION INTRAVENOUS at 18:13

## 2024-03-02 RX ADMIN — SIMETHICONE 80 MILLIGRAM(S): 80 TABLET, CHEWABLE ORAL at 01:54

## 2024-03-02 RX ADMIN — SIMETHICONE 80 MILLIGRAM(S): 80 TABLET, CHEWABLE ORAL at 14:54

## 2024-03-02 RX ADMIN — Medication 1 TABLET(S): at 12:13

## 2024-03-02 RX ADMIN — Medication 200 MILLIGRAM(S): at 22:08

## 2024-03-02 RX ADMIN — HEPARIN SODIUM 5000 UNIT(S): 5000 INJECTION INTRAVENOUS; SUBCUTANEOUS at 06:30

## 2024-03-02 RX ADMIN — Medication 30 MILLIGRAM(S): at 06:31

## 2024-03-02 RX ADMIN — Medication 200 MILLIGRAM(S): at 14:54

## 2024-03-02 RX ADMIN — SIMETHICONE 80 MILLIGRAM(S): 80 TABLET, CHEWABLE ORAL at 18:13

## 2024-03-02 RX ADMIN — Medication 100 MILLIGRAM(S): at 10:20

## 2024-03-02 RX ADMIN — Medication 30 MILLIGRAM(S): at 19:00

## 2024-03-02 RX ADMIN — Medication 200 MILLIGRAM(S): at 06:31

## 2024-03-02 RX ADMIN — Medication 30 MILLIGRAM(S): at 23:57

## 2024-03-02 NOTE — DISCHARGE NOTE OB - HOSPITAL COURSE
29 yo  admitted for elective induction of labor. Pt had primary C/S due to category 2 tracing. Pt with suspected chorio, S/P amp/gent/ clinda. Pt is stable and cleared of discharge

## 2024-03-02 NOTE — DISCHARGE NOTE OB - PLAN OF CARE
No sexual activity, nothing in the vagina, no heavy lifting, no pushing, no straining, no strenuous activities.  Take pain medication as needed; stool softener; dulcolax as needed if constipated  Walk for exercise: helps recovery   Continue prenatal vitamins daily especially whole course of breastfeeding  See your OB in the office for follow up post partum check, call the office set up appointment in 2weeks  Clean wound daily with soap and water; please note wound for redness or swelling; if noted go to the office right away so the doctor can evaluate the wound for possible wound infection Take iron, folic acid, vitamin C, and prenatal vitamins. Eat iron rich and fortified foods

## 2024-03-02 NOTE — PROGRESS NOTE ADULT - ASSESSMENT
A/P: 27 yo  POD #1 s/p primary c/s. Supectd Chorio,  pt stable    -Pain management as needed  -DVT ppx: OOB and ambulate, Heparin   -Labs reviewed  -Vitals reviewed  -f/u Rpt CBC   -Continue regular diet   - Continue Gent/Clinda IV   -Encourage breastfeeding   - postpartum anemia protocol   -d/w Duc  A/P: 29 yo  POD #1 s/p primary c/s. Supectd Chorio,  pt stable    -Pain management as needed  -DVT ppx: OOB and ambulate, Heparin   -Labs reviewed  -Vitals reviewed  -f/u Rpt CBC   -Continue regular diet   - Continue Gent/Clinda IV   -Encourage breastfeeding   - postpartum anemia protocol   - Simethicone and MOM   -d/w Duc

## 2024-03-02 NOTE — DISCHARGE NOTE OB - MEDICATION SUMMARY - MEDICATIONS TO TAKE
I will START or STAY ON the medications listed below when I get home from the hospital:    ibuprofen 600 mg oral tablet  -- 1 tab(s) by mouth every 6 hours as needed for  moderate pain  -- Indication: For moderate pain    Tylenol 325 mg oral capsule  -- 3 cap(s) by mouth every 6 hours as needed for  mild pain  -- Indication: For mild pain    Prenatal Plus oral tablet  -- 1 tab(s) by mouth once a day  -- Indication: For breastfeeding    ferrous sulfate 325 mg (65 mg elemental iron) oral tablet  -- 1 tab(s) by mouth once a day  -- Indication: For Anemia    Colace 2-in-1 50 mg-8.6 mg oral tablet  -- 2 tab(s) by mouth once a day (at bedtime) as needed for  constipation  -- Indication: For Constipation    simethicone 80 mg oral tablet, chewable  -- 1 tab(s) chewed every 6 hours as needed for  heartburn  -- Indication: For gas pain    ascorbic acid 500 mg oral tablet  -- 1 tab(s) by mouth once a day  -- Indication: For Anemia

## 2024-03-02 NOTE — PROGRESS NOTE ADULT - PROBLEM SELECTOR PLAN 1
-Pain management as needed  -DVT ppx: OOB and ambulate, Heparin   -Labs reviewed  -Vitals reviewed  -f/u Rpt CBC   -Continue regular diet   - Continue Gent/Clinda IV   -Encourage breastfeeding   - postpartum anemia protocol     -d/w Duc -Pain management as needed  -DVT ppx: OOB and ambulate, Heparin   -Labs reviewed  -Vitals reviewed  -f/u Rpt CBC   -Continue regular diet   - Continue Gent/Clinda IV   -Encourage breastfeeding   - postpartum anemia protocol   - Simethicone and MOM   -d/w Duc

## 2024-03-02 NOTE — DISCHARGE NOTE OB - CARE PROVIDER_API CALL
Noah Xavier.  Obstetrics and Gynecology  [address], [address]  [place] [address]  Phone: [phone]  Fax: [phone]  Established Patient  Follow Up Time: 2 weeks

## 2024-03-02 NOTE — DISCHARGE NOTE OB - PATIENT PORTAL LINK FT
You can access the FollowMyHealth Patient Portal offered by [facility] by registering at the following website: [email or web address]. By joining [facility]’s FollowMyHealth portal, you will also be able to view your health information using other applications (apps) compatible with our system.

## 2024-03-02 NOTE — DISCHARGE NOTE OB - CARE PLAN
Principal Discharge DX:	 delivery delivered  Assessment and plan of treatment:	No sexual activity, nothing in the vagina, no heavy lifting, no pushing, no straining, no strenuous activities.  Take pain medication as needed; stool softener; dulcolax as needed if constipated  Walk for exercise: helps recovery   Continue prenatal vitamins daily especially whole course of breastfeeding  See your OB in the office for follow up post partum check, call the office set up appointment in 2weeks  Clean wound daily with soap and water; please note wound for redness or swelling; if noted go to the office right away so the doctor can evaluate the wound for possible wound infection  Secondary Diagnosis:	Anemia  Assessment and plan of treatment:	Take iron, folic acid, vitamin C, and prenatal vitamins. Eat iron rich and fortified foods   1

## 2024-03-02 NOTE — DISCHARGE NOTE OB - MEDICATION SUMMARY - MEDICATIONS TO STOP TAKING
I will STOP taking the medications listed below when I get home from the hospital:    terconazole 0.4% vaginal cream  -- 1 applicatorful intravaginally once a day (at bedtime)

## 2024-03-03 VITALS
TEMPERATURE: 98 F | OXYGEN SATURATION: 98 % | SYSTOLIC BLOOD PRESSURE: 107 MMHG | HEART RATE: 80 BPM | RESPIRATION RATE: 18 BRPM | DIASTOLIC BLOOD PRESSURE: 70 MMHG

## 2024-03-03 LAB
ALBUMIN SERPL ELPH-MCNC: 1.9 G/DL — LOW (ref 3.5–5)
ALP SERPL-CCNC: 254 U/L — HIGH (ref 40–120)
ALT FLD-CCNC: 12 U/L DA — SIGNIFICANT CHANGE UP (ref 10–60)
ANION GAP SERPL CALC-SCNC: 8 MMOL/L — SIGNIFICANT CHANGE UP (ref 5–17)
AST SERPL-CCNC: 41 U/L — HIGH (ref 10–40)
BASOPHILS # BLD AUTO: 0.04 K/UL — SIGNIFICANT CHANGE UP (ref 0–0.2)
BASOPHILS NFR BLD AUTO: 0.2 % — SIGNIFICANT CHANGE UP (ref 0–2)
BILIRUB SERPL-MCNC: 0.4 MG/DL — SIGNIFICANT CHANGE UP (ref 0.2–1.2)
BUN SERPL-MCNC: 7 MG/DL — SIGNIFICANT CHANGE UP (ref 7–18)
CALCIUM SERPL-MCNC: 8.5 MG/DL — SIGNIFICANT CHANGE UP (ref 8.4–10.5)
CHLORIDE SERPL-SCNC: 109 MMOL/L — HIGH (ref 96–108)
CO2 SERPL-SCNC: 24 MMOL/L — SIGNIFICANT CHANGE UP (ref 22–31)
CREAT SERPL-MCNC: 0.72 MG/DL — SIGNIFICANT CHANGE UP (ref 0.5–1.3)
EGFR: 117 ML/MIN/1.73M2 — SIGNIFICANT CHANGE UP
EOSINOPHIL # BLD AUTO: 0.1 K/UL — SIGNIFICANT CHANGE UP (ref 0–0.5)
EOSINOPHIL NFR BLD AUTO: 0.6 % — SIGNIFICANT CHANGE UP (ref 0–6)
GLUCOSE SERPL-MCNC: 78 MG/DL — SIGNIFICANT CHANGE UP (ref 70–99)
HCT VFR BLD CALC: 27.2 % — LOW (ref 34.5–45)
HGB BLD-MCNC: 8.6 G/DL — LOW (ref 11.5–15.5)
IMM GRANULOCYTES NFR BLD AUTO: 0.7 % — SIGNIFICANT CHANGE UP (ref 0–0.9)
LYMPHOCYTES # BLD AUTO: 17.4 % — SIGNIFICANT CHANGE UP (ref 13–44)
LYMPHOCYTES # BLD AUTO: 2.85 K/UL — SIGNIFICANT CHANGE UP (ref 1–3.3)
MCHC RBC-ENTMCNC: 25.3 PG — LOW (ref 27–34)
MCHC RBC-ENTMCNC: 31.6 GM/DL — LOW (ref 32–36)
MCV RBC AUTO: 80 FL — SIGNIFICANT CHANGE UP (ref 80–100)
MONOCYTES # BLD AUTO: 0.92 K/UL — HIGH (ref 0–0.9)
MONOCYTES NFR BLD AUTO: 5.6 % — SIGNIFICANT CHANGE UP (ref 2–14)
NEUTROPHILS # BLD AUTO: 12.39 K/UL — HIGH (ref 1.8–7.4)
NEUTROPHILS NFR BLD AUTO: 75.5 % — SIGNIFICANT CHANGE UP (ref 43–77)
NRBC # BLD: 0 /100 WBCS — SIGNIFICANT CHANGE UP (ref 0–0)
PLATELET # BLD AUTO: 240 K/UL — SIGNIFICANT CHANGE UP (ref 150–400)
POTASSIUM SERPL-MCNC: 3.6 MMOL/L — SIGNIFICANT CHANGE UP (ref 3.5–5.3)
POTASSIUM SERPL-SCNC: 3.6 MMOL/L — SIGNIFICANT CHANGE UP (ref 3.5–5.3)
PROT SERPL-MCNC: 5.5 G/DL — LOW (ref 6–8.3)
RBC # BLD: 3.4 M/UL — LOW (ref 3.8–5.2)
RBC # FLD: 15.5 % — HIGH (ref 10.3–14.5)
SODIUM SERPL-SCNC: 141 MMOL/L — SIGNIFICANT CHANGE UP (ref 135–145)
WBC # BLD: 16.42 K/UL — HIGH (ref 3.8–10.5)
WBC # FLD AUTO: 16.42 K/UL — HIGH (ref 3.8–10.5)

## 2024-03-03 PROCEDURE — 85384 FIBRINOGEN ACTIVITY: CPT

## 2024-03-03 PROCEDURE — 86923 COMPATIBILITY TEST ELECTRIC: CPT

## 2024-03-03 PROCEDURE — 85730 THROMBOPLASTIN TIME PARTIAL: CPT

## 2024-03-03 PROCEDURE — 88307 TISSUE EXAM BY PATHOLOGIST: CPT

## 2024-03-03 PROCEDURE — 86900 BLOOD TYPING SEROLOGIC ABO: CPT

## 2024-03-03 PROCEDURE — 85610 PROTHROMBIN TIME: CPT

## 2024-03-03 PROCEDURE — 80053 COMPREHEN METABOLIC PANEL: CPT

## 2024-03-03 PROCEDURE — 86901 BLOOD TYPING SEROLOGIC RH(D): CPT

## 2024-03-03 PROCEDURE — 59025 FETAL NON-STRESS TEST: CPT

## 2024-03-03 PROCEDURE — 86850 RBC ANTIBODY SCREEN: CPT

## 2024-03-03 PROCEDURE — 85025 COMPLETE CBC W/AUTO DIFF WBC: CPT

## 2024-03-03 PROCEDURE — 59050 FETAL MONITOR W/REPORT: CPT

## 2024-03-03 PROCEDURE — 36415 COLL VENOUS BLD VENIPUNCTURE: CPT

## 2024-03-03 PROCEDURE — 86762 RUBELLA ANTIBODY: CPT

## 2024-03-03 PROCEDURE — 86780 TREPONEMA PALLIDUM: CPT

## 2024-03-03 RX ORDER — ASCORBIC ACID 60 MG
1 TABLET,CHEWABLE ORAL
Qty: 30 | Refills: 0
Start: 2024-03-03 | End: 2024-04-01

## 2024-03-03 RX ORDER — FERROUS SULFATE 325(65) MG
1 TABLET ORAL
Qty: 30 | Refills: 0
Start: 2024-03-03 | End: 2024-04-01

## 2024-03-03 RX ORDER — IBUPROFEN 200 MG
1 TABLET ORAL
Qty: 56 | Refills: 0
Start: 2024-03-03 | End: 2024-03-16

## 2024-03-03 RX ORDER — SENNOSIDES/DOCUSATE SODIUM 8.6MG-50MG
2 TABLET ORAL
Qty: 28 | Refills: 0
Start: 2024-03-03 | End: 2024-03-16

## 2024-03-03 RX ORDER — ACETAMINOPHEN 500 MG
3 TABLET ORAL
Qty: 168 | Refills: 0
Start: 2024-03-03 | End: 2024-03-16

## 2024-03-03 RX ORDER — SIMETHICONE 80 MG/1
1 TABLET, CHEWABLE ORAL
Qty: 56 | Refills: 0
Start: 2024-03-03 | End: 2024-03-16

## 2024-03-03 RX ADMIN — SIMETHICONE 80 MILLIGRAM(S): 80 TABLET, CHEWABLE ORAL at 05:50

## 2024-03-03 RX ADMIN — Medication 100 MILLIGRAM(S): at 02:01

## 2024-03-03 RX ADMIN — Medication 30 MILLIGRAM(S): at 12:18

## 2024-03-03 RX ADMIN — SIMETHICONE 80 MILLIGRAM(S): 80 TABLET, CHEWABLE ORAL at 02:06

## 2024-03-03 RX ADMIN — SIMETHICONE 80 MILLIGRAM(S): 80 TABLET, CHEWABLE ORAL at 14:54

## 2024-03-03 RX ADMIN — Medication 325 MILLIGRAM(S): at 12:21

## 2024-03-03 RX ADMIN — Medication 975 MILLIGRAM(S): at 03:45

## 2024-03-03 RX ADMIN — Medication 975 MILLIGRAM(S): at 02:46

## 2024-03-03 RX ADMIN — Medication 975 MILLIGRAM(S): at 10:38

## 2024-03-03 RX ADMIN — Medication 100 MILLIGRAM(S): at 10:00

## 2024-03-03 RX ADMIN — SENNA PLUS 2 TABLET(S): 8.6 TABLET ORAL at 12:19

## 2024-03-03 RX ADMIN — Medication 200 MILLIGRAM(S): at 05:50

## 2024-03-03 RX ADMIN — Medication 1 TABLET(S): at 12:19

## 2024-03-03 RX ADMIN — Medication 30 MILLIGRAM(S): at 00:55

## 2024-03-03 RX ADMIN — Medication 10 MILLIGRAM(S): at 09:38

## 2024-03-03 RX ADMIN — SIMETHICONE 80 MILLIGRAM(S): 80 TABLET, CHEWABLE ORAL at 09:38

## 2024-03-03 RX ADMIN — Medication 1 MILLIGRAM(S): at 12:19

## 2024-03-03 RX ADMIN — Medication 975 MILLIGRAM(S): at 09:38

## 2024-03-03 RX ADMIN — HEPARIN SODIUM 5000 UNIT(S): 5000 INJECTION INTRAVENOUS; SUBCUTANEOUS at 05:17

## 2024-03-03 RX ADMIN — FAMOTIDINE 20 MILLIGRAM(S): 10 INJECTION INTRAVENOUS at 05:50

## 2024-03-03 RX ADMIN — Medication 30 MILLIGRAM(S): at 06:23

## 2024-03-03 RX ADMIN — Medication 30 MILLIGRAM(S): at 05:52

## 2024-03-03 RX ADMIN — Medication 30 MILLIGRAM(S): at 13:18

## 2024-03-03 RX ADMIN — Medication 200 MILLIGRAM(S): at 14:54

## 2024-03-03 NOTE — PROGRESS NOTE ADULT - PROBLEM SELECTOR PLAN 1
-Pain management as needed  -cont post op care  -VTE prophylaxis: heparin OOB and ambulate  - Labs and vital reviewed   -encourage incentive spirometer use  -Encourage breastfeeding   - dulcolax suppository to help with BM   - Post partum anemia protocol   - Continue Gent/Clinda   - Pending BM, pt can have late discharge today   - Discharge instructions discussed, pt expresses understanding     Pt seen and evaluated with Dr Xavier at the bedside

## 2024-03-03 NOTE — PROGRESS NOTE ADULT - ASSESSMENT
A/P: 27 yo  POD #2 s/p c/s. pt with suspected chorio and and anemia , pt stable    -Pain management as needed  -cont post op care  -VTE prophylaxis: heparin OOB and ambulate  - Labs and vital reviewed   -encourage incentive spirometer use  -Encourage breastfeeding   - dulcolax suppository to help with BM   - Post partum anemia protocol   - Continue Gent/Clinda until pt is ready for discharge   - Pending BM, pt can have late discharge today   - Discharge instructions discussed, pt expresses understanding     Pt seen and evaluated with Dr Xavier at the bedside

## 2024-03-03 NOTE — PROGRESS NOTE ADULT - SUBJECTIVE AND OBJECTIVE BOX
[facility] NOTE  POD#0  Pt seen at bedside, currently breastfeeding , complaining of mild abdominal pain expected post-op. Denies headache, dizziness, fever/chills, chest pain, shortness of breath, n/v/d, worsening abdominal pain, pelvic pain, vaginal bleeding, lower ext pain/swelling.     T(C): 36.8 (24 @ 20:59), Max: 37 (24 @ 17:00)  HR: 93 (24 @ 20:59) (93 - 143)  BP: 105/64 (24 @ 20:59) (93/52 - 112/69)  RR: 17 (24 @ 20:59) (17 - 22)  SpO2: 96% (24 @ 20:59) (96% - 99%)    gen: a&ox3, NAD  abd: soft distended, appropriately tender on palpation, fundus firm and below umbilicus, dressing in place C/D/I  pelvic: minimal lochia, cintron in place draining clear urine  ext: venodynes in place; no calf pain or tenderness, no swelling    
Patient seen at bedside resting comfortably offers no new complaints. + ambulating, + spontaneously yet.  + flatus; no bowel movement; tolerating reg liquid diet.  Pt both breast and bottle feeding. Pt denies headache, weakness, chest pain, shortness of breath, blurry vision or epigastric pain, N/V/D,  palpitations, worsening vaginal bleeding.    Vital Signs Last 24 Hrs  T(C): 36.7 (02 Mar 2024 06:06), Max: 37.4 (02 Mar 2024 00:44)  T(F): 98.1 (02 Mar 2024 06:06), Max: 99.4 (02 Mar 2024 00:44)  HR: 89 (02 Mar 2024 06:06) (84 - 143)  BP: 98/66 (02 Mar 2024 06:06) (93/52 - 116/68)  BP(mean): 81 (01 Mar 2024 19:00) (60 - 91)  RR: 17 (02 Mar 2024 06:06) (16 - 22)  SpO2: 97% (02 Mar 2024 06:06) (96% - 99%)    Parameters below as of 02 Mar 2024 06:06  Patient On (Oxygen Delivery Method): room air        Gen: A&O x 3, NAD  Breast: Soft, nontender, nonengorged, no erythema  Abdomen: soft; Nontender, mildly distended; dressing removed incision C/D/I steri strips in place, fundus is firm and below the umbilicus   Gyn: minimal lochia   Extremities: Nontender, no calf tenderness                          9.2    20.85 )-----------( 223      ( 02 Mar 2024 08:05 )             28.4       
Patient seen at bedside resting comfortably offers no new complaints. + Ambulation, + void without difficulty, + flatus;  no bm; tolerating regular diet. Pt both breastfeeding and bottle feeding. Pt denies weakness, headache, blurry vision or epigastric pain, chest pain, shortness of breath, N/V/D,  dizziness, palpitations, worsening vaginal bleeding.    Vital Signs Last 24 Hrs  T(C): 36.4 (03 Mar 2024 10:07), Max: 36.7 (02 Mar 2024 17:15)  T(F): 97.5 (03 Mar 2024 10:07), Max: 98.1 (02 Mar 2024 17:15)  HR: 88 (03 Mar 2024 10:07) (88 - 101)  BP: 111/71 (03 Mar 2024 10:07) (94/59 - 111/71)  BP(mean): --  RR: 18 (03 Mar 2024 10:07) (18 - 18)  SpO2: 98% (03 Mar 2024 10:07) (96% - 98%)    Parameters below as of 03 Mar 2024 06:00  Patient On (Oxygen Delivery Method): room air        Gen: A&O x 3, NAD  Breast: Soft, nontender, nonengorged, no erythema  Abdomen: +BS; soft; Nontender, nondistended, Incision C/D/I steri strips in place, fundus is firm and below the umbilicus    Gyn: Minimal lochia  Extremities: Nontender, no worsening edema                          8.6    16.42 )-----------( 240      ( 03 Mar 2024 06:00 )             27.2

## 2024-03-03 NOTE — CHART NOTE - NSCHARTNOTEFT_GEN_A_CORE
Patient with temperature of 38.3 (100.9) at 14:38  Temperature 37.4 at 1332, ampicillin 2 grams started and gentamicin 120 mg   Patient currently receiving 1 g IV tylenol in bolus       Maternal HR 130s   toco q 2-3 minutes
Nurse notified PA that patient had a bowel movement. Pt is requesting to go home.  Pt is due for dose of Gent at this time, pt will complete dose and is cleared for discharge. Discharge instructions reviewed
